# Patient Record
Sex: FEMALE | Race: WHITE | NOT HISPANIC OR LATINO | Employment: FULL TIME | URBAN - METROPOLITAN AREA
[De-identification: names, ages, dates, MRNs, and addresses within clinical notes are randomized per-mention and may not be internally consistent; named-entity substitution may affect disease eponyms.]

---

## 2024-08-02 ENCOUNTER — PATIENT MESSAGE (OUTPATIENT)
Dept: OBSTETRICS AND GYNECOLOGY | Facility: CLINIC | Age: 44
End: 2024-08-02
Payer: COMMERCIAL

## 2024-08-13 ENCOUNTER — PATIENT MESSAGE (OUTPATIENT)
Dept: OBSTETRICS AND GYNECOLOGY | Facility: CLINIC | Age: 44
End: 2024-08-13
Payer: COMMERCIAL

## 2024-08-13 DIAGNOSIS — F41.9 ANXIETY DUE TO INVASIVE PROCEDURE: Primary | ICD-10-CM

## 2024-08-14 RX ORDER — ALPRAZOLAM 0.5 MG/1
0.5 TABLET ORAL
Qty: 1 TABLET | Refills: 0 | Status: SHIPPED | OUTPATIENT
Start: 2024-08-14 | End: 2024-09-13

## 2024-11-06 DIAGNOSIS — G43.E19 INTRACTABLE CHRONIC MIGRAINE WITH AURA AND WITHOUT STATUS MIGRAINOSUS: ICD-10-CM

## 2024-11-06 DIAGNOSIS — R51.9 WORSENING HEADACHES: ICD-10-CM

## 2024-11-06 RX ORDER — TOPIRAMATE 100 MG/1
100 TABLET, FILM COATED ORAL 2 TIMES DAILY
Qty: 60 TABLET | Refills: 5 | Status: SHIPPED | OUTPATIENT
Start: 2024-11-06

## 2024-12-13 ENCOUNTER — PATIENT MESSAGE (OUTPATIENT)
Dept: NEUROLOGY | Facility: CLINIC | Age: 44
End: 2024-12-13
Payer: COMMERCIAL

## 2024-12-13 DIAGNOSIS — G43.E19 INTRACTABLE CHRONIC MIGRAINE WITH AURA AND WITHOUT STATUS MIGRAINOSUS: ICD-10-CM

## 2024-12-13 DIAGNOSIS — R51.9 WORSENING HEADACHES: ICD-10-CM

## 2024-12-13 RX ORDER — RIBOFLAVIN (VITAMIN B2) 100 MG
200 TABLET ORAL DAILY
Qty: 60 TABLET | Refills: 1 | Status: SHIPPED | OUTPATIENT
Start: 2024-12-13

## 2024-12-13 RX ORDER — RIBOFLAVIN (VITAMIN B2) 100 MG
200 TABLET ORAL DAILY
Qty: 60 TABLET | Refills: 12 | OUTPATIENT
Start: 2024-12-13

## 2024-12-13 RX ORDER — LANOLIN ALCOHOL/MO/W.PET/CERES
400 CREAM (GRAM) TOPICAL DAILY
Qty: 60 TABLET | Refills: 1 | Status: SHIPPED | OUTPATIENT
Start: 2024-12-13

## 2024-12-13 RX ORDER — INDOMETHACIN 50 MG/1
50 CAPSULE ORAL 3 TIMES DAILY PRN
Qty: 20 CAPSULE | Refills: 5 | OUTPATIENT
Start: 2024-12-13

## 2024-12-13 RX ORDER — INDOMETHACIN 50 MG/1
50 CAPSULE ORAL 3 TIMES DAILY PRN
Qty: 20 CAPSULE | Refills: 2 | Status: SHIPPED | OUTPATIENT
Start: 2024-12-13

## 2024-12-13 RX ORDER — NARATRIPTAN 2.5 MG/1
TABLET ORAL
Qty: 9 TABLET | Refills: 3 | Status: SHIPPED | OUTPATIENT
Start: 2024-12-13

## 2024-12-13 RX ORDER — NARATRIPTAN 2.5 MG/1
TABLET ORAL
Qty: 9 TABLET | Refills: 9 | OUTPATIENT
Start: 2024-12-13

## 2024-12-13 RX ORDER — LANOLIN ALCOHOL/MO/W.PET/CERES
400 CREAM (GRAM) TOPICAL DAILY
Qty: 60 TABLET | Refills: 2 | OUTPATIENT
Start: 2024-12-13

## 2024-12-13 NOTE — TELEPHONE ENCOUNTER
Staff left a voice message for patient, informing her provider refilled her medications. Staff also scheduled her follow up virtual appt.

## 2025-01-27 ENCOUNTER — ON-DEMAND VIRTUAL (OUTPATIENT)
Dept: URGENT CARE | Facility: CLINIC | Age: 45
End: 2025-01-27
Payer: COMMERCIAL

## 2025-01-27 DIAGNOSIS — M54.9 BACK PAIN, UNSPECIFIED BACK LOCATION, UNSPECIFIED BACK PAIN LATERALITY, UNSPECIFIED CHRONICITY: Primary | ICD-10-CM

## 2025-01-27 NOTE — PROGRESS NOTES
Subjective:      Patient ID: Aleida Simmons is a 44 y.o. female.    Vitals:  vitals were not taken for this visit.     Chief Complaint: Back Pain      Visit Type: TELE AUDIOVISUAL    Patient Location: Home     Present with the patient at the time of consultation: TELEMED PRESENT WITH PATIENT: spouse    Past Medical History:   Diagnosis Date    Migraine headache      Past Surgical History:   Procedure Laterality Date    CHOLECYSTECTOMY      PELVIC LAPAROSCOPY  2013    ENDO     Review of patient's allergies indicates:   Allergen Reactions    Ciprofloxacin (bulk) Swelling    Percocet [oxycodone-acetaminophen] Nausea And Vomiting     Any pain meds that affect seratonin make pt. severely ill    Amoxicillin Hives and Itching    Codeine Nausea And Vomiting    Lortab [hydrocodone-acetaminophen] Nausea And Vomiting    Nasacort [triamcinolone acetonide] Other (See Comments)     Nose bleeding    Penicillins Hives and Itching    Ultram [tramadol] Nausea And Vomiting     Current Outpatient Medications on File Prior to Visit   Medication Sig Dispense Refill    ALPRAZolam (XANAX) 0.5 MG tablet Take 1 tablet (0.5 mg total) by mouth On call Procedure for Anxiety. 1 tablet 0    atogepant (QULIPTA) 60 mg Tab Take 1 tablet by mouth once daily. 30 tablet 5    famotidine (PEPCID) 20 MG tablet Take 1 tablet (20 mg total) by mouth 2 (two) times daily. For 2-4 weeks 28 tablet 1    fish oil-omega-3 fatty acids 300-1,000 mg capsule Take 2 g by mouth once daily.      FLAXSEED ORAL Take 1 g by mouth.      hydrocortisone 2.5 % cream Apply topically 2 (two) times daily. During flares 20 g 0    ibuprofen (ADVIL,MOTRIN) 200 MG tablet Take 200 mg by mouth every 6 (six) hours as needed.      indomethacin (INDOCIN) 50 MG capsule Take 1 capsule (50 mg total) by mouth 3 (three) times daily as needed (migraine). 20 capsule 2    ketoconazole (NIZORAL) 2 % cream Apply topically daily as needed (rash under breasts). 15 g 0    magnesium oxide (MAG-OX) 400 mg  (241.3 mg magnesium) tablet Take 1 tablet (400 mg total) by mouth once daily. 60 tablet 1    naratriptan (AMERGE) 2.5 MG tablet take 1 tablet 2.5 mg at onset of headache, may repeat in 4 hours if needed 9 tablet 3    nirmatrelvir-ritonavir (PAXLOVID) 300 mg (150 mg x 2)-100 mg copackaged tablets (EUA) Take 3 tablets by mouth 2 (two) times daily. Each dose contains 2 nirmatrelvir (pink tablets) and 1 ritonavir (white tablet). Take all 3 tablets together (Patient not taking: Reported on 7/23/2024) 30 tablet 0    riboflavin, vitamin B2, (VITAMIN B-2) 100 mg Tab tablet Take 2 tablets (200 mg total) by mouth once daily. 60 tablet 1    topiramate (TOPAMAX) 100 MG tablet Take 1 tablet (100 mg total) by mouth 2 (two) times daily. 60 tablet 5     No current facility-administered medications on file prior to visit.     Family History   Problem Relation Name Age of Onset    Cancer Paternal Grandmother      Breast cancer Paternal Grandmother      Cancer Maternal Grandmother      Heart disease Mother      Breast cancer Paternal Aunt      Cancer Paternal Aunt      Colon cancer Neg Hx      Ovarian cancer Neg Hx      Uterine cancer Neg Hx      Cervical cancer Neg Hx             [facility] Peq Odvv Intake    1/27/2025 12:17 PM CST - Filed by Patient   What is your current physical address in the event of a medical emergency? [address]   Are you able to take your vital signs? No   Please attach any relevant images or files    Is your employer contracted with [facility]? No         Back pain this morning just below rib cage on left. Ate food and suddenly had severe pain for about 8 min in all of back and radiating to chest. Then all pain went away and has not return. Pain was sharp and severe.     Back Pain  Pertinent negatives include no fever.       Constitution: Negative for fatigue and fever.   Respiratory:  Negative for chest tightness and cough.    Musculoskeletal:  Positive for back pain.        Objective:   The  physical exam was conducted virtually.  Physical Exam   Pulmonary/Chest: Effort normal.   Abdominal: Normal appearance.   Neurological: She is alert.       Assessment:     1. Back pain, unspecified back location, unspecified back pain laterality, unspecified chronicity        Plan:       Back pain, unspecified back location, unspecified back pain laterality, unspecified chronicity      Pain is completely gone but patient understands that if she has pain again to go to the ER.

## 2025-02-02 ENCOUNTER — HOSPITAL ENCOUNTER (EMERGENCY)
Facility: HOSPITAL | Age: 45
Discharge: HOME | End: 2025-02-03
Attending: EMERGENCY MEDICINE
Payer: COMMERCIAL

## 2025-02-02 VITALS
OXYGEN SATURATION: 98 % | HEART RATE: 69 BPM | BODY MASS INDEX: 25.61 KG/M2 | TEMPERATURE: 99 F | DIASTOLIC BLOOD PRESSURE: 95 MMHG | SYSTOLIC BLOOD PRESSURE: 157 MMHG | RESPIRATION RATE: 17 BRPM | HEIGHT: 64 IN | WEIGHT: 150 LBS

## 2025-02-02 DIAGNOSIS — R07.89 CHEST TIGHTNESS: ICD-10-CM

## 2025-02-02 DIAGNOSIS — R06.02 SHORTNESS OF BREATH: Primary | ICD-10-CM

## 2025-02-02 LAB
ALBUMIN SERPL BCP-MCNC: 4.2 G/DL (ref 3.5–5.2)
ALP SERPL-CCNC: 63 U/L (ref 40–150)
ALT SERPL W/O P-5'-P-CCNC: 12 U/L (ref 10–44)
ANION GAP SERPL CALC-SCNC: 10 MMOL/L (ref 8–16)
AST SERPL-CCNC: 16 U/L (ref 10–40)
B-HCG UR QL: NEGATIVE
BASOPHILS # BLD AUTO: 0.07 K/UL (ref 0–0.2)
BASOPHILS NFR BLD: 1.3 % (ref 0–1.9)
BILIRUB SERPL-MCNC: 0.3 MG/DL (ref 0.1–1)
BUN SERPL-MCNC: 11 MG/DL (ref 6–20)
CALCIUM SERPL-MCNC: 9.5 MG/DL (ref 8.7–10.5)
CHLORIDE SERPL-SCNC: 112 MMOL/L (ref 95–110)
CO2 SERPL-SCNC: 20 MMOL/L (ref 23–29)
CREAT SERPL-MCNC: 1.1 MG/DL (ref 0.5–1.4)
CTP QC/QA: YES
D DIMER PPP IA.FEU-MCNC: 0.41 MG/L FEU
DIFFERENTIAL METHOD BLD: ABNORMAL
EOSINOPHIL # BLD AUTO: 0.2 K/UL (ref 0–0.5)
EOSINOPHIL NFR BLD: 4.5 % (ref 0–8)
ERYTHROCYTE [DISTWIDTH] IN BLOOD BY AUTOMATED COUNT: 15.8 % (ref 11.5–14.5)
EST. GFR  (NO RACE VARIABLE): >60 ML/MIN/1.73 M^2
GLUCOSE SERPL-MCNC: 98 MG/DL (ref 70–110)
HCT VFR BLD AUTO: 36.3 % (ref 37–48.5)
HCV AB SERPL QL IA: NORMAL
HGB BLD-MCNC: 11.3 G/DL (ref 12–16)
HIV 1+2 AB+HIV1 P24 AG SERPL QL IA: NORMAL
IMM GRANULOCYTES # BLD AUTO: 0.01 K/UL (ref 0–0.04)
IMM GRANULOCYTES NFR BLD AUTO: 0.2 % (ref 0–0.5)
LYMPHOCYTES # BLD AUTO: 1.9 K/UL (ref 1–4.8)
LYMPHOCYTES NFR BLD: 34.5 % (ref 18–48)
MCH RBC QN AUTO: 22.8 PG (ref 27–31)
MCHC RBC AUTO-ENTMCNC: 31.1 G/DL (ref 32–36)
MCV RBC AUTO: 73 FL (ref 82–98)
MONOCYTES # BLD AUTO: 0.5 K/UL (ref 0.3–1)
MONOCYTES NFR BLD: 9.3 % (ref 4–15)
NEUTROPHILS # BLD AUTO: 2.7 K/UL (ref 1.8–7.7)
NEUTROPHILS NFR BLD: 50.2 % (ref 38–73)
NRBC BLD-RTO: 0 /100 WBC
PLATELET # BLD AUTO: 297 K/UL (ref 150–450)
PMV BLD AUTO: 11.3 FL (ref 9.2–12.9)
POTASSIUM SERPL-SCNC: 3.7 MMOL/L (ref 3.5–5.1)
PROT SERPL-MCNC: 7.9 G/DL (ref 6–8.4)
RBC # BLD AUTO: 4.96 M/UL (ref 4–5.4)
SODIUM SERPL-SCNC: 142 MMOL/L (ref 136–145)
WBC # BLD AUTO: 5.36 K/UL (ref 3.9–12.7)

## 2025-02-02 PROCEDURE — 84443 ASSAY THYROID STIM HORMONE: CPT | Performed by: PHYSICIAN ASSISTANT

## 2025-02-02 PROCEDURE — 93010 ELECTROCARDIOGRAM REPORT: CPT | Mod: ,,, | Performed by: INTERNAL MEDICINE

## 2025-02-02 PROCEDURE — 86803 HEPATITIS C AB TEST: CPT | Performed by: PHYSICIAN ASSISTANT

## 2025-02-02 PROCEDURE — 85025 COMPLETE CBC W/AUTO DIFF WBC: CPT | Performed by: EMERGENCY MEDICINE

## 2025-02-02 PROCEDURE — 99285 EMERGENCY DEPT VISIT HI MDM: CPT | Mod: 25

## 2025-02-02 PROCEDURE — 93005 ELECTROCARDIOGRAM TRACING: CPT

## 2025-02-02 PROCEDURE — 81025 URINE PREGNANCY TEST: CPT | Performed by: EMERGENCY MEDICINE

## 2025-02-02 PROCEDURE — 80053 COMPREHEN METABOLIC PANEL: CPT | Performed by: EMERGENCY MEDICINE

## 2025-02-02 PROCEDURE — 85379 FIBRIN DEGRADATION QUANT: CPT | Performed by: PHYSICIAN ASSISTANT

## 2025-02-02 PROCEDURE — 84484 ASSAY OF TROPONIN QUANT: CPT | Performed by: PHYSICIAN ASSISTANT

## 2025-02-02 PROCEDURE — 87389 HIV-1 AG W/HIV-1&-2 AB AG IA: CPT | Performed by: PHYSICIAN ASSISTANT

## 2025-02-03 LAB
OHS QRS DURATION: 86 MS
OHS QTC CALCULATION: 440 MS
TROPONIN I SERPL DL<=0.01 NG/ML-MCNC: <3 NG/L (ref 0–14)
TSH SERPL DL<=0.005 MIU/L-ACNC: 1.02 UIU/ML (ref 0.4–4)

## 2025-02-03 NOTE — ED PROVIDER NOTES
Encounter Date: 2/2/2025       History     Chief Complaint   Patient presents with    Shortness of Breath     Intermittent shortness of breath and chest tightness for the past week. Symptoms resumed an hour ago.     The history is provided by the patient and medical records. No  was used.     Aleida Simmons is a 44 y.o. female with medical history of migraine presenting to the ED with the chief complaint of shortness of breath.     Patient had an episode of SOB with chest tightness and upper back pain a few days ago. Episode occurred while sitting on the couch working on her computer. Lasted for 15-20 minutes then resolved. Tonight she had a 2nd similar episode which prompted her ED visit. Symptoms have since resolved. She reports daily stressors and denies significant changes. +Vapes. Drinks 1 cup of coffee per day. No personal or family history of cardiac disease. Reports history of DVTs in her grandmother. No hormone use.     Review of patient's allergies indicates:   Allergen Reactions    Ciprofloxacin (bulk) Swelling    Percocet [oxycodone-acetaminophen] Nausea And Vomiting     Any pain meds that affect seratonin make pt. severely ill    Amoxicillin Hives and Itching    Codeine Nausea And Vomiting    Lortab [hydrocodone-acetaminophen] Nausea And Vomiting    Nasacort [triamcinolone acetonide] Other (See Comments)     Nose bleeding    Penicillins Hives and Itching    Ultram [tramadol] Nausea And Vomiting     Past Medical History:   Diagnosis Date    Migraine headache      Past Surgical History:   Procedure Laterality Date    CHOLECYSTECTOMY      PELVIC LAPAROSCOPY  2013    ENDO     Family History   Problem Relation Name Age of Onset    Cancer Paternal Grandmother      Breast cancer Paternal Grandmother      Cancer Maternal Grandmother      Heart disease Mother      Breast cancer Paternal Aunt      Cancer Paternal Aunt      Colon cancer Neg Hx      Ovarian cancer Neg Hx      Uterine cancer Neg  Hx      Cervical cancer Neg Hx       Social History     Tobacco Use    Smoking status: Former     Current packs/day: 0.00     Types: Cigarettes     Quit date: 2006     Years since quittin.3    Smokeless tobacco: Never   Substance Use Topics    Alcohol use: Yes     Comment: patient has occasional drink    Drug use: No     Review of Systems   Respiratory:  Positive for shortness of breath.        Physical Exam     Initial Vitals [25]   BP Pulse Resp Temp SpO2   (!) 177/79 101 17 97.8 °F (36.6 °C) 100 %      MAP       --         Physical Exam    Constitutional: She appears well-developed and well-nourished. She is not diaphoretic. No distress.   HENT:   Head: Normocephalic and atraumatic. Mouth/Throat: Oropharynx is clear and moist. No oropharyngeal exudate.   Eyes: Conjunctivae and EOM are normal. Pupils are equal, round, and reactive to light. No scleral icterus.   Neck: Neck supple.   Normal range of motion.  Cardiovascular:  Normal rate and regular rhythm.           Pulmonary/Chest: Breath sounds normal. No respiratory distress. She has no wheezes.   Abdominal: Abdomen is soft. She exhibits no distension. There is no abdominal tenderness. There is no rebound.   Musculoskeletal:         General: No tenderness or edema. Normal range of motion.      Cervical back: Normal range of motion and neck supple.     Neurological: She is alert and oriented to person, place, and time. She has normal strength. No sensory deficit.   Skin: Skin is warm and dry. No rash noted. No erythema.   Psychiatric: She has a normal mood and affect.         ED Course   Procedures  Labs Reviewed   CBC W/ AUTO DIFFERENTIAL - Abnormal       Result Value    WBC 5.36      RBC 4.96      Hemoglobin 11.3 (*)     Hematocrit 36.3 (*)     MCV 73 (*)     MCH 22.8 (*)     MCHC 31.1 (*)     RDW 15.8 (*)     Platelets 297      MPV 11.3      Immature Granulocytes 0.2      Gran # (ANC) 2.7      Immature Grans (Abs) 0.01      Lymph # 1.9    "   Mono # 0.5      Eos # 0.2      Baso # 0.07      nRBC 0      Gran % 50.2      Lymph % 34.5      Mono % 9.3      Eosinophil % 4.5      Basophil % 1.3      Differential Method Automated      Narrative:     Release to patient->Immediate   COMPREHENSIVE METABOLIC PANEL - Abnormal    Sodium 142      Potassium 3.7      Chloride 112 (*)     CO2 20 (*)     Glucose 98      BUN 11      Creatinine 1.1      Calcium 9.5      Total Protein 7.9      Albumin 4.2      Total Bilirubin 0.3      Alkaline Phosphatase 63      AST 16      ALT 12      eGFR >60.0      Anion Gap 10      Narrative:     Release to patient->Immediate   HEPATITIS C ANTIBODY    Hepatitis C Ab Non-reactive      Narrative:     Release to patient->Immediate   HIV 1 / 2 ANTIBODY    HIV 1/2 Ag/Ab Non-reactive      Narrative:     Release to patient->Immediate   TSH    TSH 1.024     D DIMER, QUANTITATIVE    D-Dimer 0.41     TROPONIN I HIGH SENSITIVITY    Troponin I High Sensitivity <3     POCT URINE PREGNANCY    POC Preg Test, Ur Negative       Acceptable Yes       EKG Readings: (Independently Interpreted)   NSR 90 bpm. Normal axis. Normal T waves. No STEMI       Imaging Results              X-Ray Chest AP Portable (Final result)  Result time 02/02/25 23:10:03      Final result by Randolph Lentz MD (02/02/25 23:10:03)                   Impression:      No acute cardiopulmonary finding identified on this single view.      Electronically signed by: Randolph Lentz MD  Date:    02/02/2025  Time:    23:10               Narrative:    EXAMINATION:  XR CHEST AP PORTABLE    CLINICAL HISTORY:  Provided history is "Shortness of breath;  ".    TECHNIQUE:  One view of the chest.    COMPARISON:  04/04/2016.    FINDINGS:  Cardiac silhouette is not enlarged.  No focal consolidation.  No sizable pleural effusion.  No pneumothorax.                                       Medications - No data to display  Medical Decision Making  44 y.o. female with medical history of " migraine presenting to the ED c/o episodic chest tightness, SOB, lightheadedness for the past few days.     DDx includes but not limited to ACS, cardiac arrhythmia, thyroid disease, pulmonary embolism, myocarditis, pericarditis, pneumonia, electrolyte disturbance, dehydration.     Amount and/or Complexity of Data Reviewed  Labs: ordered. Decision-making details documented in ED Course.  Radiology: ordered and independent interpretation performed.  ECG/medicine tests: ordered and independent interpretation performed.               ED Course as of 02/03/25 0044   Sun Feb 02, 2025   2324 CXR without large consolidation, pleural effusion, or pneumothorax on my read. [BA]   Mon Feb 03, 2025 0043 TSH: 1.024  Normal TSH [BA]   0043 Troponin I High Sensitivity: <3  Normal [BA]   0043 D-Dimer: 0.41  Normal D-dimer [BA]   0043 Creatinine: 1.1 [BA]   0043 WBC: 5.36 [BA]   0043 Hemoglobin(!): 11.3 [BA]   0043 Hematocrit(!): 36.3 [BA]   0044 Patient resting comfortably on reassessment. Denies any medical complaints while in the ED. Okay for outpatient follow-up with her PCP. Patient expresses understanding and agreeable to the plan. Return to ED precautions given for new, worsening, or concerning symptoms. [BA]      ED Course User Index  [BA] Andrez Ba PA-C                           Clinical Impression:  Final diagnoses:  [R06.02] Shortness of breath (Primary)  [R07.89] Chest tightness          ED Disposition Condition    Discharge Stable          ED Prescriptions    None       Follow-up Information       Follow up With Specialties Details Why Contact Info Additional Information    [facility]   [address]  [facility] [address]  [phone] [facility] Please park in surface lot and check in at central registration desk             Andrez Ba PA-C  02/03/25 0044

## 2025-02-03 NOTE — ED TRIAGE NOTES
"  Patient identifiers for Aleida Simmons 44 y.o. female checked and correct.  Chief Complaint   Patient presents with    Shortness of Breath     Intermittent shortness of breath and chest tightness for the past week. Symptoms resumed an hour ago.   Reports chronic migraines but denies sinus congestion, cough, fever, chills, sore throat. Pt also reports recent pain, cramping to R calf. Denies swelling, recent travel.   Past Medical History:   Diagnosis Date    Migraine headache      Allergies reported:   Review of patient's allergies indicates:   Allergen Reactions    Ciprofloxacin (bulk) Swelling    Percocet [oxycodone-acetaminophen] Nausea And Vomiting     Any pain meds that affect seratonin make pt. severely ill    Amoxicillin Hives and Itching    Codeine Nausea And Vomiting    Lortab [hydrocodone-acetaminophen] Nausea And Vomiting    Nasacort [triamcinolone acetonide] Other (See Comments)     Nose bleeding    Penicillins Hives and Itching    Ultram [tramadol] Nausea And Vomiting         HEENT: Denies vision changes. Denies ear drainage or hearing loss. No c/o nasal drainage. Denies dysphagia or voice changes.   Appearance: Pt awake, alert & oriented to person, place & time. Pt in no acute distress at present time. Pt is clean and well groomed with clothes appropriately fastened.   Skin: Skin warm, dry & intact. Color consistent with ethnicity. Mucous membranes moist. No breakdown or brusing noted.   Musculoskeletal: Patient moving all extremities well, no obvious swelling or deformities noted. +R calf pain/ cramping  Respiratory: Respirations spontaneous, even, and non-labored. Visible chest rise noted. Airway is open and patent. No accessory muscle use noted. +SOB, chest "tightness."  Neurologic: Sensation is intact. Speech is clear and appropriate. Eyes open spontaneously, behavior appropriate to situation, follows commands, facial expression symmetrical, bilateral hand grasp equal and even, purposeful motor " response noted.  Cardiac: All peripheral pulses present. No Bilateral lower extremity edema.   Abdomen: Abdomen soft, non distended.   : Pt voids independently, denies dysuria, hematuria, frequency.

## 2025-02-03 NOTE — DISCHARGE INSTRUCTIONS
Follow-up with your primary care provider for further evaluation.    Return to the emergency room for new, worsening, or concerning symptoms.     Future Appointments   Date Time Provider Department Center   2/20/2025  2:00 PM Nita Mccartney MD [facility] [id number]   2/21/2025  2:40 PM Teri Lockhart MD [facility] IM [facility]   3/13/2025  3:40 PM Margie Cantu MD [facility] NEURO [facility]

## 2025-02-07 ENCOUNTER — PATIENT OUTREACH (OUTPATIENT)
Dept: ADMINISTRATIVE | Facility: HOSPITAL | Age: 45
End: 2025-02-07
Payer: COMMERCIAL

## 2025-03-13 ENCOUNTER — OFFICE VISIT (OUTPATIENT)
Dept: NEUROLOGY | Facility: CLINIC | Age: 45
End: 2025-03-13
Payer: COMMERCIAL

## 2025-03-13 DIAGNOSIS — G43.E09 CHRONIC MIGRAINE WITH AURA WITHOUT STATUS MIGRAINOSUS, NOT INTRACTABLE: Primary | ICD-10-CM

## 2025-03-13 DIAGNOSIS — Z76.0 ENCOUNTER FOR MEDICATION REFILL: ICD-10-CM

## 2025-03-13 RX ORDER — NARATRIPTAN 2.5 MG/1
TABLET ORAL
Qty: 9 TABLET | Refills: 11 | Status: SHIPPED | OUTPATIENT
Start: 2025-03-13

## 2025-03-13 RX ORDER — TOPIRAMATE 100 MG/1
100 TABLET, FILM COATED ORAL 2 TIMES DAILY
Qty: 60 TABLET | Refills: 11 | Status: SHIPPED | OUTPATIENT
Start: 2025-03-13

## 2025-03-13 RX ORDER — LANOLIN ALCOHOL/MO/W.PET/CERES
400 CREAM (GRAM) TOPICAL DAILY
Qty: 60 TABLET | Refills: 5 | Status: SHIPPED | OUTPATIENT
Start: 2025-03-13

## 2025-03-13 RX ORDER — RIBOFLAVIN (VITAMIN B2) 100 MG
200 TABLET ORAL DAILY
Qty: 60 TABLET | Refills: 5 | Status: SHIPPED | OUTPATIENT
Start: 2025-03-13

## 2025-03-13 RX ORDER — ATOGEPANT 60 MG/1
60 TABLET ORAL DAILY
Qty: 30 TABLET | Refills: 11 | Status: SHIPPED | OUTPATIENT
Start: 2025-03-13

## 2025-03-13 RX ORDER — INDOMETHACIN 50 MG/1
50 CAPSULE ORAL 3 TIMES DAILY PRN
Qty: 20 CAPSULE | Refills: 5 | Status: SHIPPED | OUTPATIENT
Start: 2025-03-13

## 2025-03-13 NOTE — PROGRESS NOTES
Patient ID: [id number]  The patient location is: [facility]   The chief complaint leading to consultation is: migraine f/u    Visit type: audiovisual    Face to Face time with patient: 8    Each patient to whom he or she provides medical services by telemedicine is:  (1) informed of the relationship between the physician and patient and the respective role of any other health care provider with respect to management of the patient; and (2) notified that he or she may decline to receive medical services by telemedicine and may withdraw from such care at any time.    Notes:     Subjective:     HPI:  Aleida Simmons is a 44 y.o. LH female with chronic migraines with aura. she is presenting today for annual f/u.     Interval history (03/13/2025):  Her headaches have responded to Qulipta, at some point headache frequency was down to x4-5 per month.  She has had more stress within the last couple of months and headaches are up to x8-9 per month again  Most times respond to combination indomethacin and Amerge, occasionally needs 2 doses.    Headache history (4/23/2024)  Age at onset: 10 y/o, worse x4 months  Course over time: increasing in both severity and frequency  Location: L>>R temple and retro-orbital  Character: sharp and throbbing   Intensity: 5-8 on a scale from 1 to 10, occasionally 10 out of 10  Frequency:  12-15 per month .   Duration: several hours to all day  Timing: are usually worse first thing in the morning   Mild/moderate/severe. Work attendance or other daily activities are affected by the headaches.  Aura: preceded by an aura consisting of photopsias  Associated symptoms: N/V, Photosensitivity, and Phonophobia, puffy eyes + runny nose  Associated neurologic symptoms: numbness of extremities at baseline   Precipitating factors: Weather changes, Menstrual periods, and Alcohol  Aggravating factors: Physical activity   Home treatment: Amerge + Indocin with marked improvement. Headaches recur the  same night or the next day. Ubrelvy doesn't help  ER visits: No  Positive Hx of: Asthma and Nephrolithiasis  Is medication overuse contributing to the patient's current migraine burden: No    She used to follow with Dr. Juarez until 2017, moved to [place] for a few years and moved back to [facility] a few months ago.  She was on Topamax 400 mg which was reduced to 2001-2 years ago in [place]. She was taken off of propranolol as well.   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     Headache Medication history:  AED Neuromodulators  MAOIs  Ergot Alkaloids    Acetazolamide (Diamox) [] Phenelzine (Nardil) [] Dihydroergotamine (Migranal) []   Carbamazepine (Tegretol) [] Tranylcypromine (Parnate) [] Ergotamine (Ergomar) []   Gabapentin (Neurontin) [] Antihistamine/Serotonergic  Triptans    Lacosamide (Vimpat) [] Cyproheptadine (Periactin) [] Almotriptan (Axert) []   Lamotrigine (Lamictal) [] Antihypertensives  Eletriptan (Relpax) []   Levatiracetam (Keppra) [] Atenolol (Tenormin) [] Frovatriptan (Frova) []   Oxcarbazepine (Trileptal) [] Bisoprostol (Zebeta) [] Naratriptan (Amerge) [x]   Phenobarbital [] Candesartan (Atacand) [] Rizatriptan (Maxalt) []     Nebivolol (Bystolic)  Sumatriptan (Imitrex) (oral + injection, developed side effects) [x]   Levetiracetam (Keppra)  Cardeilol (Coreg) [] Zolmitriptan (Zomig) []   Phenytoin (Dilantin) [] Diltiazem (Cardizem) []     Pregabalin (Lyrica) [] Lisinopril (Prinivil, Zestril) [] Combo Abortives    Primidone (Mysoline) [] Metoprolol (Toprol) [] BC Powder []   Tiagabine (Gabatril) [] Nadolol (Corgard) [] Butalbital and Acetaminophen (Bupap) []   Topiramate (Topamax)  (Trokendi) [x] Nicardipine (Cardene) []     Vigabatrin (Sabril) [] Nimodipine (Nimotop) [] Butalbital, Acetaminophen, and caffeine (Fioricet) []   Valproic Acid (Depakote) (Divalproex Sodium) [] Propranolol (Inderal) [x]     Zonisamide (Zonegran) [] Telmisartan (Micardis) [] Butalbital, Aspirin,  and caffeine (Fiorinal) []   Benzodiazepines  Timolol (Blocadren) []     Alprazolam (Xanax) [] Verapamil (Calan, Verelan) [] Butalbital, Caffeine, Acetaminophen, and Codeine (Fioricet with Codeine) []   Diazepam (Valium) [] NSAIDs      Lorazepam (Ativan) [] Acetaminophen (Tylenol) []     Clonazepam (Klonopin) [] Acetylsalicylic Acid (Aspirin) [] Butalbital, Caffeine, Aspirin, and Codeine  (Fiorinal with Codeine) []   Antidepressants  Diclofenac (Cambia) []     Amitriptyline (Elavil) [x] Ibuprofen (Motrin) []     Desipramine (Norpramin) [] Indomethacin (Indocin) [x] Aspirin, Caffeine, and Acetaminophen (Excedrin) (Goodys) []   Doxepin (Sinequan) [] Ketoprofen (Orudis) []     Fluoxetine (Prozac) [] Ketorolac (Toradol) [] Acetaminophen, Dichloralphenazone, and Isometheptene (Midrin) []   Imipramine (Tofranil) [] Naproxen (Anaprox) (Aleve) []     Nortriptyline (Pamelor) [] Meclofenamic Acid (Meclomen) []     Venlafaxine (Effexor) [] Meloxicam (Mobic) [] Procedures    Desvenlafazine (Pristiq) [] Monoclonals  Greater occipital nerve block [x]   Duloxetine (Cymbalta) [] Eptinezumab [] Cervical, Thoracic, Lumbar radiofrequency ablation []   Trazadone [] Erenumab-aooe (Aimovig) [] Spenopalatine ganglion block []   Wellbutrin [] Galcanezumab (Emgality) [] Occipital neuro stimulation []   Protriptyline (Vivactil) [] Fremanazumab-vfrm (Ajovy)  Cervical, Thoracic, Lumbar, Caudal Epidural steroid injection []   Escitalopram (Lexapro) [] Other [] Sacroiliac joint steroid injection []   Celexa [] Memantine (Namenda) [] Transforaminal epidural steroid injection []   Oral CGRP inhibitors  Botox [] Facet joint injections []   Atogepant (Qulipta) (effective) [x] Baclofen (Lioresal) [] Cervical, Thoracic, Lumbar medial branch blocks []   Rimegepant (Nurtec) []   Cefaly []   Ubrogepant (Ubrelvy) (ineffective + arthralgia) [x]   Gamma Core []    []   Iovera []    []   Transcranial Magnetic stimulation []     Review of Systems:  Review of  Systems   HENT:  Negative for sinus pain and tinnitus.    Eyes:  Positive for photophobia. Negative for blurred vision and double vision.   Gastrointestinal:  Positive for nausea. Negative for vomiting.   Musculoskeletal:  Negative for falls.   Neurological:  Positive for headaches. Negative for dizziness and weakness.   All other systems reviewed and are negative.     Past Medical History:  Active Ambulatory Problems     Diagnosis Date Noted    Migraine headache 11/06/2012    Occipital neuralgia 11/06/2012    Sensation of fullness in both ears 01/07/2016    Otalgia of both ears 01/07/2016    EVELYN (obstructive sleep apnea) 11/01/2016    Weight gain 11/01/2016    MELODIE positive 05/07/2024    Mild intermittent asthma without complication 05/07/2024     Resolved Ambulatory Problems     Diagnosis Date Noted    Gallstones 03/13/2014    Menstrual migraine 04/08/2015     No Additional Past Medical History       Allergies:  Review of patient's allergies indicates:   Allergen Reactions    Ciprofloxacin (bulk) Swelling    Percocet [oxycodone-acetaminophen] Nausea And Vomiting     Any pain meds that affect seratonin make pt. severely ill    Amoxicillin Hives and Itching    Codeine Nausea And Vomiting    Lortab [hydrocodone-acetaminophen] Nausea And Vomiting    Nasacort [triamcinolone acetonide] Other (See Comments)     Nose bleeding    Penicillins Hives and Itching    Ultram [tramadol] Nausea And Vomiting       Pertinent Family History:  Family History   Problem Relation Name Age of Onset    Cancer Paternal Grandmother      Breast cancer Paternal Grandmother      Cancer Maternal Grandmother      Heart disease Mother      Breast cancer Paternal Aunt      Cancer Paternal Aunt      Colon cancer Neg Hx      Ovarian cancer Neg Hx      Uterine cancer Neg Hx      Cervical cancer Neg Hx         Pertinent Social History:  Social History     Tobacco Use    Smoking status: Former     Current packs/day: 0.00     Types: Cigarettes     Quit  date: 2006     Years since quittin.4    Smokeless tobacco: Never   Substance Use Topics    Alcohol use: Yes     Comment: patient has occasional drink    Drug use: No       Medications:  Current Outpatient Medications   Medication Instructions    ALPRAZolam (XANAX) 0.5 mg, Oral, On Call Procedure    atogepant (QULIPTA) 60 mg Tab Take 1 tablet by mouth once daily.    famotidine (PEPCID) 20 mg, Oral, 2 times daily, For 2-4 weeks    fish oil-omega-3 fatty acids 300-1,000 mg capsule 2 g, Oral, Daily    FLAXSEED ORAL 1 g, Oral    hydrocortisone 2.5 % cream Topical (Top), 2 times daily, During flares    ibuprofen (ADVIL,MOTRIN) 200 mg, Every 6 hours PRN    indomethacin (INDOCIN) 50 mg, Oral, 3 times daily PRN    ketoconazole (NIZORAL) 2 % cream Topical (Top), Daily PRN    magnesium oxide (MAG-OX) 400 mg, Oral, Daily    naratriptan (AMERGE) 2.5 MG tablet take 1 tablet (2.5 mg) at onset of headache, may repeat in 4 hours if needed    nirmatrelvir-ritonavir (PAXLOVID) 300 mg (150 mg x 2)-100 mg copackaged tablets (EUA) Take 3 tablets by mouth 2 (two) times daily. Each dose contains 2 nirmatrelvir (pink tablets) and 1 ritonavir (white tablet). Take all 3 tablets together    riboflavin (vitamin B2) (VITAMIN B-2) 200 mg, Oral, Daily    topiramate (TOPAMAX) 100 mg, Oral, 2 times daily        Objective:     *exam is limited due to the virtual nature of this visit    General:  Well-appearing, well-nourished, NAD, cooperative    Neurologic Exam:   Awake, alert and oriented x3  Speech spontaneous and fluent, intact comprehension.   Adequate fund of knowledge, vocabulary.  EOM intact. No ophthalmoplegia.   Facial expression is full and symmetric.   Hearing is intact.  Antigravity in BUE. No tremor.    Pertinent lab results    Lab Results   Component Value Date    [id number] Non-reactive 2025     Lab Results   Component Value Date    TSH 1.024 2025    WBC 5.36 2025    LYMPH 1.9 2025    LYMPH 34.5  02/02/2025    RBC 4.96 02/02/2025    HGB 11.3 (L) 02/02/2025    HCT 36.3 (L) 02/02/2025    MCV 73 (L) 02/02/2025     02/02/2025     02/02/2025    K 3.7 02/02/2025    CO2 20 (L) 02/02/2025    BUN 11 02/02/2025    CREATININE 1.1 02/02/2025    CALCIUM 9.5 02/02/2025    AST 16 02/02/2025    ALT 12 02/02/2025     Pertinent imaging results    * the reviewed images:  3/13/2013  MRI Brain wo contrast:  Scattered foci of T2/FLAIR hyperintense signal throughout bilateral subcortical white matter, most likely secondary to migraines.     Other pertinent studies  None    Assessment:   Aleida Simmons is a 44 y.o. LH female with chronic migraines with aura who presents for annual f/u. She has had a favorable response to Qulipta with >50% reduction in headache frequency. Only the last couple of months headaches are slightly more frequently but still not as frequent as before Qulipta. She would like to stay the course with Topamax for this reason, when she gets back to <6 MHDs we can discuss a gradual slow taper off of Topamax. All other medications were refilled.     1. Chronic migraine with aura without status migrainosus, not intractable    2. Encounter for medication refill      Plan:     - atogepant (QULIPTA) 60 mg Tab; Take 1 tablet by mouth once daily.  Dispense: 30 tablet; Refill: 11  - indomethacin (INDOCIN) 50 MG capsule; Take 1 capsule (50 mg total) by mouth 3 (three) times daily as needed (migraine).  Dispense: 20 capsule; Refill: 5  - magnesium oxide (MAG-OX) 400 mg (241.3 mg magnesium) tablet; Take 1 tablet (400 mg total) by mouth once daily.  Dispense: 60 tablet; Refill: 5  - naratriptan (AMERGE) 2.5 MG tablet; take 1 tablet (2.5 mg) at onset of headache, may repeat in 4 hours if needed  Dispense: 9 tablet; Refill: 11  - riboflavin, vitamin B2, (VITAMIN B-2) 100 mg Tab tablet; Take 2 tablets (200 mg total) by mouth once daily.  Dispense: 60 tablet; Refill: 5  - topiramate (TOPAMAX) 100 MG tablet; Take 1  tablet (100 mg total) by mouth 2 (two) times daily.  Dispense: 60 tablet; Refill: 11  - Follow up: RTC in 1 year      Disclaimer: This note was partly generated using dictation software which may occasionally result in transcription errors that are missed on review.      Based on our encounter today, my overall Medical Decision Making is a Level 4     Complexity of Problem: Low (1 stable chronic illness)  Complexity of Data: Moderate (Review of >3 unique test results)  Risk of Complications and/or morbidity/mortality of Management: Moderate risk (Prescription drug management)      Margie Cantu MD    [facility]  03/13/2025

## 2025-06-13 ENCOUNTER — ON-DEMAND VIRTUAL (OUTPATIENT)
Dept: URGENT CARE | Facility: CLINIC | Age: 45
End: 2025-06-13
Payer: COMMERCIAL

## 2025-06-13 DIAGNOSIS — J06.9 UPPER RESPIRATORY TRACT INFECTION, UNSPECIFIED TYPE: Primary | ICD-10-CM

## 2025-06-13 RX ORDER — CARBINOXAMINE MALEATE 4 MG/1
1 TABLET ORAL 3 TIMES DAILY PRN
Qty: 20 EACH | Refills: 0 | Status: SHIPPED | OUTPATIENT
Start: 2025-06-13 | End: 2025-06-20

## 2025-06-13 RX ORDER — PROMETHAZINE HYDROCHLORIDE AND DEXTROMETHORPHAN HYDROBROMIDE 6.25; 15 MG/5ML; MG/5ML
5 SYRUP ORAL EVERY 6 HOURS PRN
Qty: 100 ML | Refills: 0 | Status: SHIPPED | OUTPATIENT
Start: 2025-06-13 | End: 2025-06-23

## 2025-06-13 RX ORDER — IPRATROPIUM BROMIDE 21 UG/1
2 SPRAY, METERED NASAL 2 TIMES DAILY
Qty: 30 ML | Refills: 0 | Status: SHIPPED | OUTPATIENT
Start: 2025-06-13 | End: 2025-06-23

## 2025-06-13 NOTE — PROGRESS NOTES
Subjective:      Patient ID: Aleida Simmons is a 44 y.o. female.    Vitals:  vitals were not taken for this visit.     Chief Complaint: Sinus Problem      Visit Type: TELE AUDIOVISUAL    Patient Location: Home     Present with the patient at the time of consultation: TELEMED PRESENT WITH PATIENT: None    Past Medical History:   Diagnosis Date    Migraine headache      Past Surgical History:   Procedure Laterality Date    CHOLECYSTECTOMY      PELVIC LAPAROSCOPY  2013    ENDO     Review of patient's allergies indicates:   Allergen Reactions    Ciprofloxacin (bulk) Swelling    Percocet [oxycodone-acetaminophen] Nausea And Vomiting     Any pain meds that affect seratonin make pt. severely ill    Amoxicillin Hives and Itching    Codeine Nausea And Vomiting    Lortab [hydrocodone-acetaminophen] Nausea And Vomiting    Nasacort [triamcinolone acetonide] Other (See Comments)     Nose bleeding    Penicillins Hives and Itching    Ultram [tramadol] Nausea And Vomiting     Medications Ordered Prior to Encounter[1]  Family History   Problem Relation Name Age of Onset    Cancer Paternal Grandmother      Breast cancer Paternal Grandmother      Cancer Maternal Grandmother      Heart disease Mother      Breast cancer Paternal Aunt      Cancer Paternal Aunt      Colon cancer Neg Hx      Ovarian cancer Neg Hx      Uterine cancer Neg Hx      Cervical cancer Neg Hx         Medications Ordered                [id number] [address] AT [facility] [address]   [address] [address]    Telephone: [phone]   Fax: [phone]   Hours: Not open 24 hours                         E-Prescribed (3 of 3)              carbinoxamine maleate 4 mg Tab    Sig: Take 1 tablet by mouth 3 (three) times daily as needed (congestion).       Start: 25     Quantity: 20 each Refills: 0                         ipratropium (ATROVENT) 21 mcg (0.03 %) nasal spray    Si sprays  by Each Nostril route 2 (two) times daily. for 10 days       Start: 6/13/25     Quantity: 30 mL Refills: 0                         promethazine-dextromethorphan (PROMETHAZINE-DM) 6.25-15 mg/5 mL Syrp    Sig: Take 5 mLs by mouth every 6 (six) hours as needed (cough).       Start: 6/13/25     Quantity: 100 mL Refills: 0                           No questionnaires on file.    Negative covid test    Sinus Problem  This is a new problem. Episode onset: 4 days. The problem is unchanged. There has been no fever. Associated symptoms include congestion, coughing, headaches, a hoarse voice, sneezing and a sore throat. Pertinent negatives include no sinus pressure. Treatments tried: dayquil. The treatment provided mild relief.       HENT:  Positive for congestion and sore throat. Negative for sinus pressure.    Respiratory:  Positive for cough.    Allergic/Immunologic: Positive for sneezing.   Neurological:  Positive for headaches.        Objective:   The physical exam was conducted virtually.  Physical Exam   HENT:   Nose: Rhinorrhea and congestion present.   Pulmonary/Chest: Effort normal.   Abdominal: Normal appearance.   Neurological: She is alert.       Assessment:     1. Upper respiratory tract infection, unspecified type        Plan:       Upper respiratory tract infection, unspecified type    Other orders  -     carbinoxamine maleate 4 mg Tab; Take 1 tablet by mouth 3 (three) times daily as needed (congestion).  Dispense: 20 each; Refill: 0  -     promethazine-dextromethorphan (PROMETHAZINE-DM) 6.25-15 mg/5 mL Syrp; Take 5 mLs by mouth every 6 (six) hours as needed (cough).  Dispense: 100 mL; Refill: 0  -     ipratropium (ATROVENT) 21 mcg (0.03 %) nasal spray; 2 sprays by Each Nostril route 2 (two) times daily. for 10 days  Dispense: 30 mL; Refill: 0                          [1]   Current Outpatient Medications on File Prior to Visit   Medication Sig Dispense Refill    atogepant (QULIPTA) 60 mg Tab Take 1 tablet by  mouth once daily. 30 tablet 11    famotidine (PEPCID) 20 MG tablet Take 1 tablet (20 mg total) by mouth 2 (two) times daily. For 2-4 weeks 28 tablet 1    fish oil-omega-3 fatty acids 300-1,000 mg capsule Take 2 g by mouth once daily.      FLAXSEED ORAL Take 1 g by mouth.      hydrocortisone 2.5 % cream Apply topically 2 (two) times daily. During flares 20 g 0    ibuprofen (ADVIL,MOTRIN) 200 MG tablet Take 200 mg by mouth every 6 (six) hours as needed.      indomethacin (INDOCIN) 50 MG capsule Take 1 capsule (50 mg total) by mouth 3 (three) times daily as needed (migraine). 20 capsule 5    ketoconazole (NIZORAL) 2 % cream Apply topically daily as needed (rash under breasts). 15 g 0    magnesium oxide (MAG-OX) 400 mg (241.3 mg magnesium) tablet Take 1 tablet (400 mg total) by mouth once daily. 60 tablet 5    naratriptan (AMERGE) 2.5 MG tablet take 1 tablet (2.5 mg) at onset of headache, may repeat in 4 hours if needed 9 tablet 11    riboflavin, vitamin B2, (VITAMIN B-2) 100 mg Tab tablet Take 2 tablets (200 mg total) by mouth once daily. 60 tablet 5    topiramate (TOPAMAX) 100 MG tablet Take 1 tablet (100 mg total) by mouth 2 (two) times daily. 60 tablet 11     No current facility-administered medications on file prior to visit.

## 2025-07-30 ENCOUNTER — OFFICE VISIT (OUTPATIENT)
Dept: NEUROLOGY | Facility: CLINIC | Age: 45
End: 2025-07-30
Payer: COMMERCIAL

## 2025-07-30 DIAGNOSIS — G43.E19 INTRACTABLE CHRONIC MIGRAINE WITH AURA AND WITHOUT STATUS MIGRAINOSUS: Primary | ICD-10-CM

## 2025-07-30 DIAGNOSIS — R51.9 WORSENING HEADACHES: ICD-10-CM

## 2025-07-30 RX ORDER — METHYLPREDNISOLONE 4 MG/1
TABLET ORAL
Qty: 21 EACH | Refills: 0 | Status: SHIPPED | OUTPATIENT
Start: 2025-07-30

## 2025-07-30 NOTE — PROGRESS NOTES
Patient ID: [id number]  The patient location is: [place]   The chief complaint leading to consultation is: migraine f/u    Visit type: audiovisual    Face to Face time with patient: 16    Each patient to whom he or she provides medical services by telemedicine is:  (1) informed of the relationship between the physician and patient and the respective role of any other health care provider with respect to management of the patient; and (2) notified that he or she may decline to receive medical services by telemedicine and may withdraw from such care at any time.    Notes:     Subjective:     HPI:  Aleida Simmons is a 44 y.o. LH female with chronic migraines with aura. she is presenting today for annual f/u.     Interval history (07/30/2025):  She has been having x3-5 per week. Headache frequency has increased x6 weeks.  She takes Amerge with indomethacin, occasionally needs 2 doses of Amerge. No vomiting but severely nauseous.  Stress level is really high, has difficulty falling and staying asleep. The weather changes have an impact as well.      Interval history (03/13/2025):  Her headaches have responded to Qulipta, at some point headache frequency was down to x4-5 per month.  She has had more stress within the last couple of months and headaches are up to x8-9 per month again  Most times respond to combination indomethacin and Amerge, occasionally needs 2 doses.    Headache history (4/23/2024)  Age at onset: 10 y/o, worse x4 months  Course over time: increasing in both severity and frequency  Location: L>>R temple and retro-orbital  Character: sharp and throbbing   Intensity: 5-8 on a scale from 1 to 10, occasionally 10 out of 10  Frequency: 12-15 per month.   Duration: several hours to all day  Timing: are usually worse first thing in the morning   Mild/moderate/severe. Work attendance or other daily activities are affected by the headaches.  Aura: preceded by an aura consisting of  photopsias  Associated symptoms: N/V, Photosensitivity, and Phonophobia, puffy eyes + runny nose  Associated neurologic symptoms: numbness of extremities at baseline   Precipitating factors: Weather changes, Menstrual periods, and Alcohol  Aggravating factors: Physical activity   Home treatment: Amerge + Indocin with marked improvement. Headaches recur the same night or the next day. Ubrelvy doesn't help  ER visits: No  Positive Hx of: Asthma and Nephrolithiasis  Is medication overuse contributing to the patient's current migraine burden: No    She used to follow with Dr. Juarez until 2017, moved to [place] for a few years and moved back to [facility] a few months ago.  She was on Topamax 400 mg which was reduced to 2001-2 years ago in [place]. She was taken off of propranolol as well.   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     Headache Medication history:  AED Neuromodulators  MAOIs  Ergot Alkaloids    Acetazolamide (Diamox) [] Phenelzine (Nardil) [] Dihydroergotamine (Migranal) []   Carbamazepine (Tegretol) [] Tranylcypromine (Parnate) [] Ergotamine (Ergomar) []   Gabapentin (Neurontin) [] Antihistamine/Serotonergic  Triptans    Lacosamide (Vimpat) [] Cyproheptadine (Periactin) [] Almotriptan (Axert) []   Lamotrigine (Lamictal) [] Antihypertensives  Eletriptan (Relpax) []   Levatiracetam (Keppra) [] Atenolol (Tenormin) [] Frovatriptan (Frova) []   Oxcarbazepine (Trileptal) [] Bisoprostol (Zebeta) [] Naratriptan (Amerge) [x]   Phenobarbital [] Candesartan (Atacand) [] Rizatriptan (Maxalt) []     Nebivolol (Bystolic)  Sumatriptan (Imitrex) (oral + injection, developed side effects) [x]   Levetiracetam (Keppra)  Cardeilol (Coreg) [] Zolmitriptan (Zomig) []   Phenytoin (Dilantin) [] Diltiazem (Cardizem) []     Pregabalin (Lyrica) [] Lisinopril (Prinivil, Zestril) [] Combo Abortives    Primidone (Mysoline) [] Metoprolol (Toprol) [] BC Powder []   Tiagabine (Gabatril) [] Nadolol (Corgard) []  Butalbital and Acetaminophen (Bupap) []   Topiramate (Topamax)  (Trokendi) [x] Nicardipine (Cardene) []     Vigabatrin (Sabril) [] Nimodipine (Nimotop) [] Butalbital, Acetaminophen, and caffeine (Fioricet) []   Valproic Acid (Depakote) (Divalproex Sodium) [] Propranolol (Inderal) [x]     Zonisamide (Zonegran) [] Telmisartan (Micardis) [] Butalbital, Aspirin, and caffeine (Fiorinal) []   Benzodiazepines  Timolol (Blocadren) []     Alprazolam (Xanax) [] Verapamil (Calan, Verelan) [] Butalbital, Caffeine, Acetaminophen, and Codeine (Fioricet with Codeine) []   Diazepam (Valium) [] NSAIDs      Lorazepam (Ativan) [] Acetaminophen (Tylenol) []     Clonazepam (Klonopin) [] Acetylsalicylic Acid (Aspirin) [] Butalbital, Caffeine, Aspirin, and Codeine  (Fiorinal with Codeine) []   Antidepressants  Diclofenac (Cambia) []     Amitriptyline (Elavil) [x] Ibuprofen (Motrin) []     Desipramine (Norpramin) [] Indomethacin (Indocin) [x] Aspirin, Caffeine, and Acetaminophen (Excedrin) (Goodys) []   Doxepin (Sinequan) [] Ketoprofen (Orudis) []     Fluoxetine (Prozac) [] Ketorolac (Toradol) [] Acetaminophen, Dichloralphenazone, and Isometheptene (Midrin) []   Imipramine (Tofranil) [] Naproxen (Anaprox) (Aleve) []     Nortriptyline (Pamelor) [] Meclofenamic Acid (Meclomen) []     Venlafaxine (Effexor) [] Meloxicam (Mobic) [] Procedures    Desvenlafazine (Pristiq) [] Monoclonals  Greater occipital nerve block [x]   Duloxetine (Cymbalta) [] Eptinezumab [] Cervical, Thoracic, Lumbar radiofrequency ablation []   Trazadone [] Erenumab-aooe (Aimovig) [] Spenopalatine ganglion block []   Wellbutrin [] Galcanezumab (Emgality) [] Occipital neuro stimulation []   Protriptyline (Vivactil) [] Fremanazumab-vfrm (Ajovy)  Cervical, Thoracic, Lumbar, Caudal Epidural steroid injection []   Escitalopram (Lexapro) [] Other [] Sacroiliac joint steroid injection []   Celexa [] Memantine (Namenda) [] Transforaminal epidural steroid injection []   Oral CGRP  inhibitors  Botox [] Facet joint injections []   Atogepant (Qulipta) (effective) [x] Baclofen (Lioresal) [] Cervical, Thoracic, Lumbar medial branch blocks []   Rimegepant (Nurtec) []   Cefaly []   Ubrogepant (Ubrelvy) (ineffective + arthralgia) [x]   Gamma Core []    []   Iovera []    []   Transcranial Magnetic stimulation []     Review of Systems:  Review of Systems   HENT:  Negative for sinus pain and tinnitus.    Eyes:  Positive for photophobia. Negative for blurred vision and double vision.   Gastrointestinal:  Positive for nausea. Negative for vomiting.   Musculoskeletal:  Negative for falls.   Neurological:  Positive for headaches. Negative for dizziness and weakness.   Psychiatric/Behavioral:  The patient has insomnia.    All other systems reviewed and are negative.     Past Medical History:  Active Ambulatory Problems     Diagnosis Date Noted    Migraine headache 11/06/2012    Occipital neuralgia 11/06/2012    Sensation of fullness in both ears 01/07/2016    Otalgia of both ears 01/07/2016    EVELYN (obstructive sleep apnea) 11/01/2016    Weight gain 11/01/2016    MELODIE positive 05/07/2024    Mild intermittent asthma without complication 05/07/2024     Resolved Ambulatory Problems     Diagnosis Date Noted    Gallstones 03/13/2014    Menstrual migraine 04/08/2015     No Additional Past Medical History       Allergies:  Review of patient's allergies indicates:   Allergen Reactions    Ciprofloxacin (bulk) Swelling    Percocet [oxycodone-acetaminophen] Nausea And Vomiting     Any pain meds that affect seratonin make pt. severely ill    Amoxicillin Hives and Itching    Codeine Nausea And Vomiting    Lortab [hydrocodone-acetaminophen] Nausea And Vomiting    Nasacort [triamcinolone acetonide] Other (See Comments)     Nose bleeding    Penicillins Hives and Itching    Ultram [tramadol] Nausea And Vomiting       Pertinent Family History:  Family History   Problem Relation Name Age of Onset    Cancer Paternal Grandmother       Breast cancer Paternal Grandmother      Cancer Maternal Grandmother      Heart disease Mother      Breast cancer Paternal Aunt      Cancer Paternal Aunt      Colon cancer Neg Hx      Ovarian cancer Neg Hx      Uterine cancer Neg Hx      Cervical cancer Neg Hx         Pertinent Social History:  Social History     Tobacco Use    Smoking status: Former     Current packs/day: 0.00     Types: Cigarettes     Quit date: 2006     Years since quittin.8    Smokeless tobacco: Never   Substance Use Topics    Alcohol use: Yes     Comment: patient has occasional drink    Drug use: No       Medications:  Current Outpatient Medications   Medication Instructions    atogepant (QULIPTA) 60 mg Tab Take 1 tablet by mouth once daily.    famotidine (PEPCID) 20 mg, Oral, 2 times daily, For 2-4 weeks    fish oil-omega-3 fatty acids 300-1,000 mg capsule 2 g, Oral, Daily    FLAXSEED ORAL 1 g, Oral    hydrocortisone 2.5 % cream Topical (Top), 2 times daily, During flares    ibuprofen (ADVIL,MOTRIN) 200 mg, Every 6 hours PRN    indomethacin (INDOCIN) 50 mg, Oral, 3 times daily PRN    ketoconazole (NIZORAL) 2 % cream Topical (Top), Daily PRN    magnesium oxide (MAG-OX) 400 mg, Oral, Daily    naratriptan (AMERGE) 2.5 MG tablet take 1 tablet (2.5 mg) at onset of headache, may repeat in 4 hours if needed    riboflavin (vitamin B2) (VITAMIN B-2) 200 mg, Oral, Daily    topiramate (TOPAMAX) 100 mg, Oral, 2 times daily        Objective:     *exam is limited due to the virtual nature of this visit    General:  Well-appearing, well-nourished, NAD, cooperative    Neurologic Exam:   Awake, alert and oriented x3  Speech spontaneous and fluent, intact comprehension.   Adequate fund of knowledge, vocabulary.  EOM intact. No ophthalmoplegia.   Facial expression is full and symmetric.   Hearing is intact.  Antigravity in BUE. No tremor.    Pertinent lab results  No recent relevant labs available to review     Pertinent imaging results    * the  reviewed images:  3/13/2013  MRI Brain wo contrast:  Scattered foci of T2/FLAIR hyperintense signal throughout bilateral subcortical white matter, most likely secondary to migraines.     Other pertinent studies  None    Assessment:   Aleida Simmons is a 44 y.o. LH female with chronic migraines with aura who presents with worsening of headaches. She is reporting >15 MMDs despite being on Topamax and Qulipta. She has previously tried elavil and propranolol as well. It is my medical opinion that she would benefit from Botox injections for migraines to achieve a lower frequency, intensity and overall disability burden from migraine attacks. In the interim we will do a short steroid taper to break the headache cycle.     1. Intractable chronic migraine with aura and without status migrainosus    2. Worsening headaches      Plan:     - methylPREDNISolone (MEDROL DOSEPACK) 4 mg tablet; use as directed  Dispense: 21 each; Refill: 0  - Prior authorization Order for Botox, per below protocol:      Total Botox used: 155 Units   Botox wastage: 45 Units   Injection sites:    muscle bilaterally ( a total of 10 units divided into 2 sites)   Procerus muscle (5 units)   Frontalis muscle bilaterally (a total of 20 units divided into 4 sites)   Temporalis muscle bilaterally (a total of 40 units divided into 8 sites)   Occipitalis muscle bilaterally (a total of 30 units divided into 6 sites)   Cervical paraspinal muscles (a total of 20 units divided into 4 sites)   Trapezius muscle bilaterally (a total of 30 units divided into 6 sites)                 Disclaimer: This note was partly generated using dictation software which may occasionally result in transcription errors that are missed on review.      Based on our encounter today, my overall Medical Decision Making is a Level 4     Complexity of Problem: Moderate (1 or more chronic illnesses with exacerbation, progression or treatment side effects)  Complexity of Data:  limited  Risk of Complications and/or morbidity/mortality of Management: Moderate risk (Prescription drug management)      Margie Cantu MD    [facility]  07/30/2025

## 2025-08-01 ENCOUNTER — PATIENT MESSAGE (OUTPATIENT)
Dept: NEUROLOGY | Facility: CLINIC | Age: 45
End: 2025-08-01
Payer: COMMERCIAL

## 2025-08-01 NOTE — TELEPHONE ENCOUNTER
Staff left a very detailed voicemail informing her of providers response to stop taking the medication

## 2025-08-04 ENCOUNTER — PATIENT MESSAGE (OUTPATIENT)
Dept: OBSTETRICS AND GYNECOLOGY | Facility: CLINIC | Age: 45
End: 2025-08-04
Payer: COMMERCIAL

## 2025-08-04 DIAGNOSIS — N85.8 OTHER SPECIFIED NONINFLAMMATORY DISORDERS OF UTERUS: Primary | ICD-10-CM

## 2025-08-13 ENCOUNTER — PROCEDURE VISIT (OUTPATIENT)
Dept: NEUROLOGY | Facility: CLINIC | Age: 45
End: 2025-08-13
Payer: COMMERCIAL

## 2025-08-13 DIAGNOSIS — G43.E19 INTRACTABLE CHRONIC MIGRAINE WITH AURA AND WITHOUT STATUS MIGRAINOSUS: Primary | ICD-10-CM
